# Patient Record
Sex: MALE | Race: WHITE | NOT HISPANIC OR LATINO | Employment: FULL TIME | URBAN - METROPOLITAN AREA
[De-identification: names, ages, dates, MRNs, and addresses within clinical notes are randomized per-mention and may not be internally consistent; named-entity substitution may affect disease eponyms.]

---

## 2022-06-29 ENCOUNTER — CONSULT (OUTPATIENT)
Dept: GASTROENTEROLOGY | Facility: CLINIC | Age: 27
End: 2022-06-29
Payer: COMMERCIAL

## 2022-06-29 VITALS
WEIGHT: 200 LBS | DIASTOLIC BLOOD PRESSURE: 94 MMHG | SYSTOLIC BLOOD PRESSURE: 135 MMHG | BODY MASS INDEX: 25.67 KG/M2 | HEART RATE: 72 BPM | HEIGHT: 74 IN | TEMPERATURE: 98.2 F

## 2022-06-29 DIAGNOSIS — K58.0 IRRITABLE BOWEL SYNDROME WITH DIARRHEA: Primary | ICD-10-CM

## 2022-06-29 PROCEDURE — 99204 OFFICE O/P NEW MOD 45 MIN: CPT | Performed by: INTERNAL MEDICINE

## 2022-06-29 RX ORDER — BUPROPION HYDROCHLORIDE 300 MG/1
300 TABLET ORAL DAILY
COMMUNITY

## 2022-06-29 RX ORDER — DICYCLOMINE HCL 20 MG
20 TABLET ORAL
Qty: 120 TABLET | Refills: 5 | Status: SHIPPED | OUTPATIENT
Start: 2022-06-29

## 2022-06-29 NOTE — PROGRESS NOTES
[facility] - Outpatient Consultation  Candance Singleton 32 y o  male MRN: [id number]  Encounter: [id number]      PCP: No primary care provider on file  Referring: No referring provider defined for this encounter  ASSESSMENT AND PLAN:      1  Irritable bowel syndrome with diarrhea  Endoscopic evaluation has been unremarkable, ruling out structural disease  Chronic cluster of symptoms is most consistent with irritable bowel syndrome, diarrhea predominant  Discuss natural course and history of irritable bowel syndrome  Start anti spasmodic-- dicyclomine (BENTYL) 20 mg tablet; Take 1 tablet (20 mg total) by mouth 4 (four) times a day (before meals and at bedtime)  Dispense: 120 tablet; Refill: 5  If refractory consider rifaximin  Rule out other structural disease with stool evaluation and blood work as below  - Calprotectin,Fecal; Future  - Celiac Disease Antibody Profile; Future  - Stool Enteric Bacterial Panel by PCR; Future  - Pancreatic elastase, fecal; Future  - Giardia antigen; Future  Counseled on marijuana cessation    ______________________________________________________________________    CC:  Chief Complaint   Patient presents with    Consult       HPI:      Patient is a 47-year-old male referred for 2nd opinion of diarrhea, acid reflux, generalized abdominal pain  He has NAFLD, GERD, generalized anxiety disorder/major depressive disorder  He describes chronic symptoms for more than five years of generalized abdominal pain, which is associated with frequent bowel movements and diarrhea  He usually has four bowel movements in the morning BSFS type 4, and 8 bowel movements/throughout the day  He has woken from sleep at 5:00 a m  with a bowel movement  Abdominal pain is worsened prior to a bowel movement, but does not resolve with a bowel movement  Symptoms are worse after coffee, despite a vegan diet, and despite increased activity    He was prescribed Prilosec, but is concerned about the side effects  He does smoke marijuana daily, and continues to vape  He has previously been seen by [facility] in [facility], he has undergone two endoscopies, in May 2019, June 2018, and colonoscopy in July of 2018, which have been unremarkable  REVIEW OF SYSTEMS:    CONSTITUTIONAL: Denies any fever, chills, rigors, and weight loss  HEENT: No earache or tinnitus  Denies hearing loss or visual disturbances  CARDIOVASCULAR: No chest pain or palpitations  RESPIRATORY: Denies any cough, hemoptysis, shortness of breath or dyspnea on exertion  GASTROINTESTINAL: As noted in the History of Present Illness  GENITOURINARY: No problems with urination  Denies any hematuria or dysuria  NEUROLOGIC: No dizziness or vertigo, denies headaches  MUSCULOSKELETAL: Denies any muscle or joint pain  SKIN: Denies skin rashes or itching  ENDOCRINE: Denies excessive thirst  Denies intolerance to heat or cold  PSYCHOSOCIAL: Denies depression or anxiety  Denies any recent memory loss         Historical Information   Past Medical History:   Diagnosis Date    Esophagitis determined by endoscopy     Fatty liver disease, nonalcoholic     GERD (gastroesophageal reflux disease)     Hemorrhoids      Past Surgical History:   Procedure Laterality Date    COLONOSCOPY      UPPER GASTROINTESTINAL ENDOSCOPY       Social History   Social History     Substance and Sexual Activity   Alcohol Use Yes    Comment: seldom     Social History     Substance and Sexual Activity   Drug Use Yes    Types: Marijuana     Social History     Tobacco Use   Smoking Status Former Smoker   Smokeless Tobacco Former User     Family History   Problem Relation Age of Onset    Bipolar disorder Father     Leukemia Father     Depression Maternal Grandmother     Cholecystitis Maternal Grandmother     Lung cancer Maternal Grandfather     Pancreatic cancer Paternal Grandfather        Meds/Allergies       Current Outpatient Medications:     buPROPion (WELLBUTRIN XL) 300 mg 24 hr tablet    No Known Allergies        Objective     Blood pressure 135/94, pulse 72, temperature 98 2 °F (36 8 °C), temperature source Temporal, height 6' 2" (1 88 m), weight 90 7 kg (200 lb)  Body mass index is 25 68 kg/m²  PHYSICAL EXAM:      General Appearance:   Alert, cooperative, no distress   HEENT:   Normocephalic, atraumatic, anicteric  Neck:  Supple, symmetrical, trachea midline   Lungs:   Clear to auscultation bilaterally; no rales, rhonchi or wheezing; respirations unlabored    Heart[de-identified]   Regular rate and rhythm; no murmur, rub, or gallop  Abdomen:   Soft, non-tender, non-distended; normal bowel sounds; no masses, no organomegaly    Genitalia:   Deferred    Rectal:   Deferred    Extremities:  No cyanosis, clubbing or edema    Pulses:  2+ and symmetric    Skin:  No jaundice, rashes, or lesions    Lymph nodes:  No palpable cervical lymphadenopathy        Lab Results:     No results found for: WBC, HGB, HCT, MCV, PLT    No results found for: NA, K, CL, CO2, ANIONGAP, BUN, CREATININE, GLUCOSE, GLUF, CALCIUM, CORRECTEDCA, AST, ALT, ALKPHOS, PROT, BILITOT, EGFR    No results found for: INR, PROTIME      Radiology Results:   No results found  Portions of the record may have been created with voice recognition software  Occasional wrong word or "sound a like" substitutions may have occurred due to the inherent limitations of voice recognition software  Read the chart carefully and recognize, using context, where substitutions have occurred